# Patient Record
(demographics unavailable — no encounter records)

---

## 2024-10-14 NOTE — HISTORY OF PRESENT ILLNESS
[FreeTextEntry1] : Vas- Dr Matt\par  GI- Dr Morales\par  - Dr Brambila\par  Dental- Dr Lozano\par  Orthodontist- Dr Dilshad Ferrara [address] [facility], [address], [facility]  [address]  [phone] f - [phone]

## 2024-10-14 NOTE — REASON FOR VISIT
[Follow-Up - Clinic] : a clinic follow-up of [Atrial Fibrillation] : atrial fibrillation [Coronary Artery Disease] : coronary artery disease [Hyperlipidemia] : hyperlipidemia [Hypertension] : hypertension [Medication Management] : Medication management [FreeTextEntry1] : T2D

## 2024-10-14 NOTE — REVIEW OF SYSTEMS
[Feeling Fatigued] : feeling fatigued [SOB] : shortness of breath [Joint Pain] : joint pain [Negative] : Heme/Lymph

## 2024-10-14 NOTE — ASSESSMENT
[FreeTextEntry1] : pre op -high risk patient for low risk procedure. he has severe AS and significant CAD. with his hx, would prefer he stay on antiplatlet -plan to contact surgeon and see if okay to do the procedure on asa -will switch from plavix to asa 5 days prior to procedure. he is stable and asymptomatic today, however we suspect he is minimizing his symptoms. will clear him for this low risk procedure with close follow up. inr check 10/7 after restarting warfarin scheduled.   AF- on warfarin + rate control. [id number] score is 5. Told to check INRs more frequently.    Hematuria- gross, since resolved. INR 2.2 and on Plavix.  UA, C & S and cytology sent. Dx with bladder CA. Strong Family Hx of Bladder CA- Father and GF.  ASHD- but 3VD with stents to LAD and Cx. clinically more SOB. I suspect an anginal equivalent, will update Nuc. Done Dec 2017, much improved, minimal LCx ischemia. Now with AM angina, will cath. SL NTG. Cath with tight RCA, stented and much better. On Coumadin and plavix. More Sxs of fatigue and MCDERMOTT, new EDU, will evaluate with echo and Pharm Nuc. Nuc was NL, echo with Mod AS, BRITTNI 1.4 cm2 Sept 2020. Nuc WNL. Update Echo. Reordered. BRITTNI 1.0, probable orthopnea. Seeing Dr Trimble.  Prediabetes-diet reviewed, 6.0, 6.1,  Moderate AS on echo Sept 2020, BRITTNI = 1.4 cm2, NEED TO UPDATE, HE REPORTS AM SOB UNTIL HE GETS OOB, ?Last echo Oct 2023 @ [facility].  Echo July 8th, 2024. Severe low flow AS.  s/p cath, BRITTNI 1.5 cm2. 2022.   ORTHOPNEA. BRITTNI 1.0, probable orthopnea. Referred to Dr Trimble. s/p cath, Oct 2022, clean coronaries.  BRITTNI 1.5 cm2 Oct 2023.  Right pleural effusion and retaining fluid, lasix 20 QOD.  HTN-BP mildly elevated, will add HCTZ weekly. BP up, increase to M & F.  BP way up, add Losartan 25 mg  HLD-continue statin, labs Sept 2020. LDL 55 July 2021  Carotid Dz-sees Dr Matt.  Now with b/l carotid stenosis.  Depression- Needs to exercise, needs to be with people. He finds himself on the computer all the time.  CA Screening- Dr Brambila, told to go see. Cysto (-), PSA 7 and 1.4 cystic mass in head of pancreas. Seeing Dr Nicholson  PreOp- Dental implant- will hold Coumadin for 5 days but continue 1 anti-platelet agent, call placed to dentist  LEVAR- no official dx and he defers a w/u but does snore a lot and c/o fatigue. Never went.

## 2024-10-14 NOTE — PHYSICAL EXAM
[General Appearance - Well Developed] : well developed [Normal Appearance] : normal appearance [Well Groomed] : well groomed [General Appearance - Well Nourished] : well nourished [No Deformities] : no deformities [General Appearance - In No Acute Distress] : no acute distress [Normal Conjunctiva] : the conjunctiva exhibited no abnormalities [Eyelids - No Xanthelasma] : the eyelids demonstrated no xanthelasmas [Respiration, Rhythm And Depth] : normal respiratory rhythm and effort [Exaggerated Use Of Accessory Muscles For Inspiration] : no accessory muscle use [Irregularly Irregular] : the rhythm was irregularly irregular [Abdomen Soft] : soft [Abdomen Tenderness] : non-tender [Abdomen Mass (___ Cm)] : no abdominal mass palpated [Abnormal Walk] : normal gait [Gait - Sufficient For Exercise Testing] : the gait was sufficient for exercise testing [Nail Clubbing] : no clubbing of the fingernails [Cyanosis, Localized] : no localized cyanosis [Petechial Hemorrhages (___cm)] : no petechial hemorrhages [Skin Color & Pigmentation] : normal skin color and pigmentation [] : no rash [No Venous Stasis] : no venous stasis [Skin Lesions] : no skin lesions [No Skin Ulcers] : no skin ulcer [No Xanthoma] : no  xanthoma was observed [Oriented To Time, Place, And Person] : oriented to person, place, and time [Affect] : the affect was normal [Mood] : the mood was normal [No Anxiety] : not feeling anxious [FreeTextEntry1] : B/L Dupuytren's contractures

## 2024-10-16 NOTE — HISTORY OF PRESENT ILLNESS
[FreeTextEntry1] : 83yoM w/known h/o aortic stenosis, planning for a procedure w/Dr. Trimble, was incidentally noted to have b/l ICA stenosis on CTA chest.  Pt reports a previous smoking hx but quit 30y prior, denies any previous personal episodes of adverse neurologic events, and denies family h/o CVA/TIA.  Pt recently underwent TURBT w/Dr. Beltran at [facility] and his Plavix/Warfarin were stopped due to increased bleeding risk.  He is s/p PTCA x 4 in the past via groin access.

## 2024-10-16 NOTE — PROCEDURE
[FreeTextEntry1] : Carotid duplex performed to evaluate for extracranial vascular disease demonstrates a complex fibrocalcific/heterogenous plaque in the L ICA w/associated 50-69% stenosis, R ICA stenosis >70% due to fibrocalcific plaque, /EDV 75, no evidence of dissection, antegrade vertebral artery flow b/l w/o flow-restrictive lesions.

## 2024-10-16 NOTE — ASSESSMENT
[FreeTextEntry1] : 83yoM w/known h/o aortic stenosis, planning for a procedure w/Dr. Trimble, was incidentally noted to have b/l ICA stenosis on CTA chest.  Pt reports a previous smoking hx but quit 30y prior, denies any previous personal episodes of adverse neurologic events, and denies family h/o CVA/TIA.  Pt recently underwent TURBT w/Dr. Beltran at [facility] and his Plavix/Warfarin were stopped due to increased bleeding risk.  He is s/p PTCA x 4 in the past via groin access.  Normal neuro exam noted today.  Carotid duplex performed to evaluate for extracranial vascular disease demonstrates a complex fibrocalcific/heterogenous plaque in the L ICA w/associated 50-69% stenosis, R ICA stenosis >70% due to fibrocalcific plaque, /EDV 75, no evidence of dissection, antegrade vertebral artery flow b/l w/o flow-restrictive lesions.  Pt will likely require revascularization of the R ICA in the future but will first undergo valve repair and contact our office to schedule DAREN surgery after.

## 2024-10-16 NOTE — ADDENDUM
[FreeTextEntry1] : This note was written by Skyler Smith, acting as a scribe for Dr. Jennifer Ignacio.  I, Dr. Jennifer Ignacio, have read and attest that all the information, medical decision-making, and discharge instructions within are true and accurate.  I, Dr. Jennifer Ignacio, personally performed the evaluation and management (E/M) services for this new patient.  That E/M includes conducting the initial examination, assessing all conditions, and establishing the plan of care.  Today, my ACP, Skyler Smith, was here to observe my evaluation and management services for this patient to be followed going forward.

## 2024-10-16 NOTE — PHYSICAL EXAM
[JVD] : no jugular venous distention  [Normal Thyroid] : the thyroid was normal [Carotid Bruits] : no carotid bruits [Normal Breath Sounds] : Normal breath sounds [Respiratory Effort] : normal respiratory effort [Normal Heart Sounds] : normal heart sounds [Murmur] : murmur was appreciated  [Right Carotid Bruit] : no bruit heard over the right carotid [Left Carotid Bruit] : no bruit heard over the left carotid [2+] : left 2+ [Ankle Swelling (On Exam)] : not present [Varicose Veins Of Lower Extremities] : not present [] : not present [Abdomen Masses] : No abdominal masses [Abdomen Tenderness] : ~T ~M No abdominal tenderness [No Rash or Lesion] : No rash or lesion [Purpura] : no purpura  [Petechiae] : no petechiae [Skin Ulcer] : no ulcer [Skin Induration] : no induration [Alert] : alert [Calm] : calm [de-identified] : Healthy, NAD [de-identified] : NC/AT, dinoictbronson, EOMIx6 [de-identified] : +holosystolic murmur noted in the upper chest [de-identified] : FROM throughout, strength 5/5x4, NEG Romberg/Pronator drift [de-identified] : Neurosensory/neuromotor grossly intact

## 2024-10-25 NOTE — RESULTS/DATA
[TextEntry] : 5MWT done on 8/19/2024: 1) 8.74, 2) 7.81, 3) 7.49 KCCQ done on 8/19/2024  ECHO 7/8/2024: calculated ejection fraction of 45-50%.  The aortic leaflets are thickened and calcified with reduced systolic leaflet excursion.  The aortic leaflets are thickened and calcified with reduced systolic leaflet excursion.  The calculated left ventricular stroke volume index is 31.90 ml/m (normal >35 ml/m2). There is low-flow, low-gradient severe aortic stenosis. There is trace aortic regurgitation.  There is mild mitral annular calcification. There is trace mitral regurgitation.  Structurally normal tricuspid valve with normal leaflet excursion. There is mild tricuspid regurgitation.   PYP scan on 7/26/2024 showed:  Impression: 1. No scintigraphic evidence of TTR-meduated cardiac amyloidosis. 2. Small right pleural effusion. 3. Calcification at the aortic valve suggestive of aortic stenosis. Correlate with echocardiogram findings.  Carotid Doppler 8/14/2024: Bulky calcified atherosclerotic plaque narrowing BILATERAL proximal internal carotid arteries with elevated peak systolic velocities compatible with greater than 70% stenosis.  Elevated peak systolic velocities in the. BILATERAL external carotid arteries suggesting flow-limiting stenosis.  TAVR CTs done on 8/14/2024 which showed: IMPRESSION: Cardiac: 1.  Stent present in the proximal, mid, and distal LAD; cannot exclude significant ISR 2.  Stent present in the mid RCA, cannot exclude significant ISR 3.  Calcific plaque obscures the lumen of the distal LM and the RPDA; stenosis severity is indeterminate 4.  OM1 and OM2 are not well visualized 5.  Filling defect in the ANTIONE which may present slow flow vs thrombus IMPRESSION: Extensive calcified and noncalcified atheromatous plaque throughout the aorta. No evidence of hemodynamically significant stenosis of the aorta or peripheral access vessels. 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 Mild fat stranding/fluid in the region of the gastric antrum and proximal duodenum, could be secondary to peptic ulcer disease, or other inflammatory process.  ECHO 10/2/2023:  The left ventricle is normal in size and systolic function with a calculated ejection fraction of 55-60%.   The aortic leaflets are thickened and calcified with reduced systolic leaflet excursion. The peak transvalvular velocity is 2.97 m/s, the mean transvalvular gradient is 19.00 mmHg, and the LVOT/AV velocity ratio is 0.34. The peak transaortic gradient is 35.28 mmHg. The mean transaortic gradient is 19.00 mmHg. The calculated left ventricular stroke volume index is 37.20 ml/m (normal >35 ml/m2). There is trace aortic regurgitation.  There is mild mitral regurgitation.  Structurally normal tricuspid valve with normal leaflet excursion. There is trace tricuspid regurgitation.  No pericardial effusion is seen.  TTE 4/12/2022: LF moderate to severe AS with SVI 24 and MG 11/PG20, normal EF EKG 9/13/22 Afib HR 85bpm, QRS 88 Cr. 1.08 9/13/22 Cardiac Catheterization 10/25/2022:  moderate aortic stenosis with a normal stroke volume  ECHO 10/2/2023: moderate aortic stenosis with a normal stroke volume  10/07/24 labs:  WBC14.97 H/H 13.8/43.9 Plt 253 BUN/Cr 24/1.1

## 2024-10-25 NOTE — PLAN
[TextEntry] : - continue current medication regimen.  - follow up with Dr. You for ongoing cardiology care - Dr. Trimble to coordinate with Dr. Beltran re: bladder CA treatment timing and LHC/TAVR procedure.  - will book LHC/TAVR procedure pending above. Once date is determined, the patient will need pre-procedure labs.  - return to D clinic post TAVR or as needed.   I, Dr. Cecil Trimble, personally performed the evaluation and management (E/M) services for this established patient who presents today with (a) new problem(s)/exacerbation of (an) existing condition(s). That E/M includes conducting the clinically appropriate interval history &/or exam, assessing all new/exacerbated conditions, and establishing a new plan of care. Today, my SUSI, noted herewith, was here to observe my evaluation and management service for this new problem/exacerbated condition and follow the plan of care established by me going forward.

## 2024-10-25 NOTE — ASSESSMENT
[FreeTextEntry1] : 83M with PMH of bladder CA at the bladder neck (followed by Dr. Beltran with planned treatment to start on 11/11/24), prior prostate CA, HTN, HLD, DM, CAD s/p SAMAN to mRCA (2019, on Plavix), chronic AF (on warfarin), HFrEF (LVEF 45%), and symptomatic severe LFLG severe AS (TTE 7/2024: LVEF 45%, PV 3.21, P/MG 41/24, BRITTNI 0.95, SVi 32), who presents for follow up. Dr. Trimble have independently seen and evaluated the patient in this face-to-face encounter. Patient is clinically stable, NYHA Class III. Dr. Trimble reviewed vascular surgery's recommendation and with patient. In light of his recent bladder CA diagnosis, Dr. Trimble would like to coordinate timing of his TAVR procedure and bladder CA treatment with Dr. Beltran. Will schedule patient for his LHC/TAVR pending discussion. All questions were addressed.

## 2024-10-25 NOTE — HISTORY OF PRESENT ILLNESS
[FreeTextEntry1] : Referring: Dr. You  Warfarin management per Dr. You's office.   83M with PMH of bladder CA at the bladder neck (followed by Dr. Beltran with planned treatment to start on 11/11/24), prior prostate CA, HTN, HLD, DM, CAD s/p SAMAN to mRCA (2019, on Plavix), chronic AF (on warfarin), HFrEF (LVEF 45%), and symptomatic severe LFLG severe AS (TTE 7/2024: LVEF 45%, PV 3.21, P/MG 41/24, BRITTNI 0.95, SVi 32), who presents for follow up.   Last seen in [facility] on 8/19/24: recommended LHC/TAVR pending the following:  - CTA head and neck today for further evaluation as well as an appointment with vascular if needed.   ---------------------------------------------------- CTA Head/Neck 8/19/24 IMPRESSION: 1. Severe stenosis of the right cervical internal carotid artery just distal to the bifurcation. 2. Moderate stenosis of the left cervical internal carotid artery. 3. 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 4. Moderate right pleural effusion.  Saw urologist, Dr. Beltran, on 9/12/24 for gross hematuria s/p cystoscopy, TURBN, prostate biopsy on 10/4/24  Saw Vascular on 10/16/2025- normal neuro exam noted today. Carotid duplex performed to evaluate for extracranial vascular disease demonstrates a complex fibrocalcific/heterogenous plaque in the L ICA w/associated 50-69% stenosis, R ICA stenosis >70% due to fibrocalcific plaque, /EDV 75, no evidence of dissection, antegrade vertebral artery flow b/l w/o flow-restrictive lesions. Pt will likely require revascularization of the R ICA in the future but will first undergo valve repair and contact our office to schedule DAREN surgery after.  Today, patient reports ongoing fatigue, chest pressure when he first wakes up in the morning that resolves throughout the day, and dyspnea with minimal activity.

## 2024-10-25 NOTE — HISTORY OF PRESENT ILLNESS
[FreeTextEntry1] : Referring: Dr. You  Warfarin management per Dr. You's office.   83M with PMH of bladder CA at the bladder neck (followed by Dr. Beltran with planned treatment to start on 11/11/24), prior prostate CA, HTN, HLD, DM, CAD s/p SAMAN to mRCA (2019, on Plavix), chronic AF (on warfarin), HFrEF (LVEF 45%), and symptomatic severe LFLG severe AS (TTE 7/2024: LVEF 45%, PV 3.21, P/MG 41/24, BRITTNI 0.95, SVi 32), who presents for follow up.   Last seen in [facility] on 8/19/24: recommended LHC/TAVR pending the following:  - CTA head and neck today for further evaluation as well as an appointment with vascular if needed.   ---------------------------------------------------- CTA Head/Neck 8/19/24 IMPRESSION: 1. Severe stenosis of the right cervical internal carotid artery just distal to the bifurcation. 2. Moderate stenosis of the left cervical internal carotid artery. 3. 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 4. Moderate right pleural effusion.  Saw urologist, Dr. Beltran, on 9/12/24 for gross hematuria s/p cystoscopy, TURBN, prostate biopsy on 10/4/24  Saw Vascular on 10/16/2025- normal neuro exam noted today. Carotid duplex performed to evaluate for extracranial vascular disease demonstrates a complex fibrocalcific/heterogenous plaque in the L ICA w/associated 50-69% stenosis, R ICA stenosis >70% due to fibrocalcific plaque, /EDV 75, no evidence of dissection, antegrade vertebral artery flow b/l w/o flow-restrictive lesions. Pt will likely require revascularization of the R ICA in the future but will first undergo valve repair and contact our office to schedule DAREN surgery after.  Today, patient reports ongoing fatigue, chest pressure when he first wakes up in the morning that resolves throughout the day, and dyspnea with minimal activity.

## 2024-10-25 NOTE — HISTORY OF PRESENT ILLNESS
[FreeTextEntry1] : Referring: Dr. You  Warfarin management per Dr. You's office.   83M with PMH of bladder CA at the bladder neck (followed by Dr. Beltran with planned treatment to start on 11/11/24), prior prostate CA, HTN, HLD, DM, CAD s/p SAMAN to mRCA (2019, on Plavix), chronic AF (on warfarin), HFrEF (LVEF 45%), and symptomatic severe LFLG severe AS (TTE 7/2024: LVEF 45%, PV 3.21, P/MG 41/24, BRITTNI 0.95, SVi 32), who presents for follow up.   Last seen in [facility] on 8/19/24: recommended LHC/TAVR pending the following:  - CTA head and neck today for further evaluation as well as an appointment with vascular if needed.   ---------------------------------------------------- CTA Head/Neck 8/19/24 IMPRESSION: 1. Severe stenosis of the right cervical internal carotid artery just distal to the bifurcation. 2. Moderate stenosis of the left cervical internal carotid artery. 3. 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 4. Moderate right pleural effusion.  Saw urologist, Dr. Beltran, on 9/12/24 for gross hematuria s/p cystoscopy, TURBN, prostate biopsy on 10/4/24  Saw Vascular on 10/16/2025- normal neuro exam noted today. Carotid duplex performed to evaluate for extracranial vascular disease demonstrates a complex fibrocalcific/heterogenous plaque in the L ICA w/associated 50-69% stenosis, R ICA stenosis >70% due to fibrocalcific plaque, /EDV 75, no evidence of dissection, antegrade vertebral artery flow b/l w/o flow-restrictive lesions. Pt will likely require revascularization of the R ICA in the future but will first undergo valve repair and contact our office to schedule DAREN surgery after.  Today, patient reports ongoing fatigue, chest pressure when he first wakes up in the morning that resolves throughout the day, and dyspnea with minimal activity.

## 2024-10-25 NOTE — REASON FOR VISIT
[Family Member] : family member [FreeTextEntry4] : niece: Daniella retired [employer] employee

## 2024-10-25 NOTE — REASON FOR VISIT
[Family Member] : family member [FreeTextEntry4] : niece: Daniella retired [employer] employee

## 2024-10-25 NOTE — REASON FOR VISIT
[Family Member] : family member [FreeTextEntry4] : niece: Daniella retired [employer] employee

## 2024-12-23 NOTE — ASSESSMENT
[FreeTextEntry1] : 83M with PMH of bladder CA at the bladder neck (followed by Dr. Beltran with planned treatment to start on 11/11/24), prior prostate CA, HTN, HLD, DM, CAD s/p SAMAN to mRCA (2019, on Plavix), chronic AF (on warfarin), HFrEF (LVEF 45%), and symptomatic severe LFLG severe AS (TTE 7/2024: LVEF 45%, PV 3.21, P/MG 41/24, BRITTNI 0.95, SVi 32), who presents for follow up, continued sx. NYHA II. TTE 12/18/24 now with worsening LVEF to 35-40%. D/c bladder therapy after 1 treatment dose.  -f/u labs done today -plan for TAVR; [facility] prior given dec in EF (last cath 10/22 revealed patent stents) -d/w Dr. Beltran urology re: treatment plan for bladder

## 2024-12-23 NOTE — PHYSICAL EXAM
[Well Developed] : well developed [Well Nourished] : well nourished [No Acute Distress] : no acute distress [Normal Conjunctiva] : normal conjunctiva [Normal Venous Pressure] : normal venous pressure [No Carotid Bruit] : no carotid bruit [No Rub] : no rub [No Gallop] : no gallop [Clear Lung Fields] : clear lung fields [Good Air Entry] : good air entry [No Respiratory Distress] : no respiratory distress  [Soft] : abdomen soft [Non Tender] : non-tender [No Masses/organomegaly] : no masses/organomegaly [Normal Bowel Sounds] : normal bowel sounds [Normal Gait] : normal gait [No Edema] : no edema [No Cyanosis] : no cyanosis [No Clubbing] : no clubbing [No Varicosities] : no varicosities [No Rash] : no rash [No Skin Lesions] : no skin lesions [Moves all extremities] : moves all extremities [No Focal Deficits] : no focal deficits [Normal Speech] : normal speech [Alert and Oriented] : alert and oriented [Normal memory] : normal memory [de-identified] : nml s1, dim s2, 3/6 EDU base

## 2024-12-23 NOTE — HISTORY OF PRESENT ILLNESS
[FreeTextEntry1] : Referring: Dr. You  Warfarin management per Dr. You's office.   83M with PMH of bladder CA at the bladder neck (followed by Dr. Beltran with planned treatment to start on 11/11/24), prior prostate CA, HTN, HLD, DM, CAD s/p SAMAN to mRCA (2019, on Plavix), chronic AF (on warfarin), HFrEF (LVEF 45%), and symptomatic severe LFLG severe AS (TTE 7/2024: LVEF 45%, PV 3.21, P/MG 41/24, BRITTNI 0.95, SVi 32), who presents for follow up.   Saw Vascular on 10/16/2025- normal neuro exam noted today. Carotid duplex performed to evaluate for extracranial vascular disease demonstrates a complex fibrocalcific/heterogenous plaque in the L ICA w/associated 50-69% stenosis, R ICA stenosis >70% due to fibrocalcific plaque, /EDV 75, no evidence of dissection, antegrade vertebral artery flow b/l w/o flow-restrictive lesions. Pt will likely require revascularization of the R ICA in the future but will first undergo valve repair and contact our office to schedule DAREN surgery after.  Saw urologist, Dr. Beltran, on 9/12/24 for gross hematuria s/p cystoscopy, TURBN, prostate biopsy on 10/4/24. His last visit on 11/26/20254- "patient has high grade noninvasive bladder cancer involving the bladder neck. Resection of the bladder neck actually some voiding better minimal residual on bladder, we will begin BCG therapy in a couple of weeks,. When he compeltes the [facility] therapy- they will have approximately 8 weeks of healing before I have to rebiopsy him. During that time, he will have his valve addressed surgically as well as the carotid"  Today, patient reports ongoing fatigue, chest pressure when he first wakes up in the morning that resolves throughout the day, and dyspnea with minimal activity.  Recently, Mr. Wade was prescribed a therapy treatment for a bladder condition. After the first therapy treatment, he was experiencing urgency and inability to withhold urination, leading to his discontinuation of the treatment. Now, he experiences fatigue, shortness of breath easily, and occasional chest heaviness especially in the morning hours or in the middle of the night.  ECHO today showed BRITTNI 0.72cm2, mild AI, LVEF 37% moderately reduced. TTE from 7/24 showed LVEF 45-50%.

## 2024-12-23 NOTE — ASSESSMENT
[FreeTextEntry1] : 83M with PMH of bladder CA at the bladder neck (followed by Dr. Beltran with planned treatment to start on 11/11/24), prior prostate CA, HTN, HLD, DM, CAD s/p SAMAN to mRCA (2019, on Plavix), chronic AF (on warfarin), HFrEF (LVEF 45%), and symptomatic severe LFLG severe AS (TTE 7/2024: LVEF 45%, PV 3.21, P/MG 41/24, BRITTNI 0.95, SVi 32), who presents for follow up, continued sx. NYHA II. TTE 12/18/24 now with worsening LVEF to 35-40%. D/c bladder therapy after 1 treatment dose.  -f/u labs done today -plan for TAVR; [facility] prior given dec in EF (last cath 10/22 revealed patent stents) -d/w Dr. Beltran urology re: treatment plan for bladder

## 2024-12-23 NOTE — HISTORY OF PRESENT ILLNESS
[FreeTextEntry1] : Referring: Dr. You  Warfarin management per Dr. You's office.   83M with PMH of bladder CA at the bladder neck (followed by Dr. Beltran with planned treatment to start on 11/11/24), prior prostate CA, HTN, HLD, DM, CAD s/p SAMAN to mRCA (2019, on Plavix), chronic AF (on warfarin), HFrEF (LVEF 45%), and symptomatic severe LFLG severe AS (TTE 7/2024: LVEF 45%, PV 3.21, P/MG 41/24, BRITTNI 0.95, SVi 32), who presents for follow up.   Saw Vascular on 10/16/2025- normal neuro exam noted today. Carotid duplex performed to evaluate for extracranial vascular disease demonstrates a complex fibrocalcific/heterogenous plaque in the L ICA w/associated 50-69% stenosis, R ICA stenosis >70% due to fibrocalcific plaque, /EDV 75, no evidence of dissection, antegrade vertebral artery flow b/l w/o flow-restrictive lesions. Pt will likely require revascularization of the R ICA in the future but will first undergo valve repair and contact our office to schedule DAREN surgery after.  Saw urologist, Dr. Beltran, on 9/12/24 for gross hematuria s/p cystoscopy, TURBN, prostate biopsy on 10/4/24. His last visit on 11/26/20254- "patient has high grade noninvasive bladder cancer involving the bladder neck. Resection of the bladder neck actually some voiding better minimal residual on bladder, we will begin BCG therapy in a couple of weeks,. When he compeltes the [facility] therapy- they will have approximately 8 weeks of healing before I have to rebiopsy him. During that time, he will have his valve addressed surgically as well as the carotid"  Today, patient reports ongoing fatigue, chest pressure when he first wakes up in the morning that resolves throughout the day, and dyspnea with minimal activity.  Recently, Mr. Wade was prescribed a therapy treatment for a bladder condition. After the first therapy treatment, he was experiencing urgency and inability to withhold urination, leading to his discontinuation of the treatment. Now, he experiences fatigue, shortness of breath easily, and occasional chest heaviness especially in the morning hours or in the middle of the night.  ECHO today showed BRITTNI 0.72cm2, mild AI, LVEF 37% moderately reduced. TTE from 7/24 showed LVEF 45-50%.

## 2024-12-23 NOTE — ASSESSMENT
[FreeTextEntry1] : 83M with PMH of bladder CA at the bladder neck (followed by Dr. Beltran with planned treatment to start on 11/11/24), prior prostate CA, HTN, HLD, DM, CAD s/p SAMAN to mRCA (2019, on Plavix), chronic AF (on warfarin), HFrEF (LVEF 45%), and symptomatic severe LFLG severe AS (TTE 7/2024: LVEF 45%, PV 3.21, P/MG 41/24, BRITTNI 0.95, SVi 32), who presents for follow up, continued sx. NYHA II. TTE 12/18/24 now with worsening LVEF to 35-40%. D/c bladder therapy after 1 treatment dose.  -f/u labs done today -plan for TAVR; [facility] prior given dec in EF (last cath 10/22 revealed patent stents) -d/w Dr. Beltran urology re: treatment plan for bladder

## 2024-12-23 NOTE — PHYSICAL EXAM
[Well Developed] : well developed [Well Nourished] : well nourished [No Acute Distress] : no acute distress [Normal Conjunctiva] : normal conjunctiva [Normal Venous Pressure] : normal venous pressure [No Carotid Bruit] : no carotid bruit [No Rub] : no rub [No Gallop] : no gallop [Clear Lung Fields] : clear lung fields [Good Air Entry] : good air entry [No Respiratory Distress] : no respiratory distress  [Soft] : abdomen soft [Non Tender] : non-tender [No Masses/organomegaly] : no masses/organomegaly [Normal Bowel Sounds] : normal bowel sounds [Normal Gait] : normal gait [No Edema] : no edema [No Cyanosis] : no cyanosis [No Clubbing] : no clubbing [No Varicosities] : no varicosities [No Rash] : no rash [No Skin Lesions] : no skin lesions [Moves all extremities] : moves all extremities [No Focal Deficits] : no focal deficits [Normal Speech] : normal speech [Alert and Oriented] : alert and oriented [Normal memory] : normal memory [de-identified] : nml s1, dim s2, 3/6 EDU base

## 2024-12-23 NOTE — HISTORY OF PRESENT ILLNESS
[FreeTextEntry1] : Referring: Dr. You  Warfarin management per Dr. You's office.   83M with PMH of bladder CA at the bladder neck (followed by Dr. Beltran with planned treatment to start on 11/11/24), prior prostate CA, HTN, HLD, DM, CAD s/p SAMAN to mRCA (2019, on Plavix), chronic AF (on warfarin), HFrEF (LVEF 45%), and symptomatic severe LFLG severe AS (TTE 7/2024: LVEF 45%, PV 3.21, P/MG 41/24, BRITTNI 0.95, SVi 32), who presents for follow up.   Saw Vascular on 10/16/2025- normal neuro exam noted today. Carotid duplex performed to evaluate for extracranial vascular disease demonstrates a complex fibrocalcific/heterogenous plaque in the L ICA w/associated 50-69% stenosis, R ICA stenosis >70% due to fibrocalcific plaque, /EDV 75, no evidence of dissection, antegrade vertebral artery flow b/l w/o flow-restrictive lesions. Pt will likely require revascularization of the R ICA in the future but will first undergo valve repair and contact our office to schedule DAREN surgery after.  Saw urologist, Dr. Beltran, on 9/12/24 for gross hematuria s/p cystoscopy, TURBN, prostate biopsy on 10/4/24. His last visit on 11/26/20254- "patient has high grade noninvasive bladder cancer involving the bladder neck. Resection of the bladder neck actually some voiding better minimal residual on bladder, we will begin BCG therapy in a couple of weeks,. When he compeltes the [facility] therapy- they will have approximately 8 weeks of healing before I have to rebiopsy him. During that time, he will have his valve addressed surgically as well as the carotid"  Today, patient reports ongoing fatigue, chest pressure when he first wakes up in the morning that resolves throughout the day, and dyspnea with minimal activity.  Recently, Mr. Wade was prescribed a therapy treatment for a bladder condition. After the first therapy treatment, he was experiencing urgency and inability to withhold urination, leading to his discontinuation of the treatment. Now, he experiences fatigue, shortness of breath easily, and occasional chest heaviness especially in the morning hours or in the middle of the night.  ECHO today showed BRITTNI 0.72cm2, mild AI, LVEF 37% moderately reduced. TTE from 7/24 showed LVEF 45-50%.

## 2024-12-23 NOTE — PHYSICAL EXAM
[Well Developed] : well developed [Well Nourished] : well nourished [No Acute Distress] : no acute distress [Normal Conjunctiva] : normal conjunctiva [Normal Venous Pressure] : normal venous pressure [No Carotid Bruit] : no carotid bruit [No Rub] : no rub [No Gallop] : no gallop [Clear Lung Fields] : clear lung fields [Good Air Entry] : good air entry [No Respiratory Distress] : no respiratory distress  [Soft] : abdomen soft [Non Tender] : non-tender [No Masses/organomegaly] : no masses/organomegaly [Normal Bowel Sounds] : normal bowel sounds [Normal Gait] : normal gait [No Edema] : no edema [No Cyanosis] : no cyanosis [No Clubbing] : no clubbing [No Varicosities] : no varicosities [No Rash] : no rash [No Skin Lesions] : no skin lesions [Moves all extremities] : moves all extremities [No Focal Deficits] : no focal deficits [Normal Speech] : normal speech [Alert and Oriented] : alert and oriented [Normal memory] : normal memory [de-identified] : nml s1, dim s2, 3/6 EDU base

## 2024-12-23 NOTE — REVIEW OF SYSTEMS
[Feeling Fatigued] : feeling fatigued [Chest Discomfort] : chest discomfort [Lower Ext Edema] : lower extremity edema [Negative] : Heme/Lymph [Fever] : no fever [Chills] : no chills [FreeTextEntry5] : see HPI  [FreeTextEntry8] : see HPI

## 2025-01-06 NOTE — REASON FOR VISIT
[Home] : at home, [unfilled] , at the time of the visit. [Other Location: e.g. Home (Enter Location, City,State)___] : at [unfilled] [Patient] : the patient [Self] : self [Follow-Up: _____] : a [unfilled] follow-up visit [Family Member] : family member [FreeTextEntry2] : 1/2/2025

## 2025-01-06 NOTE — ASSESSMENT
[FreeTextEntry1] : S/p TAVR- continue Amlodipine, Atorvastatin, Eliquis, Furosemide, and Metoprolol Succinate. Continue MCOT- reviewed directions for use with patient and puala Brewer.

## 2025-01-06 NOTE — PLAN
[TextEntry] : Post Operative Care:  Pt advised of importance of daily weights. Pt advised to call [facility] NP for weight gain of 3 lbs or more. Pt instructed on how to use incentive spirometer every hour, demonstrated proper use.  Pt encouraged to ambulate as much as tolerated, avoiding extreme temperatures outdoors.  Also advised to cleanse incisions daily with mild soap and water and to avoid lotions, powders, ointments or creams near or on the incision. Low salt, low fat diet encouraged and discussed.  Pt advised to avoid heavy lifting or straining.     Follow [facility] team will continue to follow up with pt status.  NP/CCC roles explained with pt understanding, contact information provided. Pt agrees to call with any questions, issues or concerns.  Worsening symptoms reviewed with patient understanding.      FOLLOW UP APPOINTMENTS:  CTS: MARCOS Stroud appointment 1/10/25 @ 9:15 am  CARDIOLOGIST: Dr. You appointment 1/9/25  PCP: Pt encouraged to follow up within one month of discharge

## 2025-01-06 NOTE — PHYSICAL EXAM
[Sclera] : the sclera and conjunctiva were normal [PERRL With Normal Accommodation] : pupils were equal in size, round, and reactive to light [Neck Appearance] : the appearance of the neck was normal [Respiration, Rhythm And Depth] : normal respiratory rhythm and effort [Examination Of The Chest] : the chest was normal in appearance [Chest Visual Inspection Thoracic Asymmetry] : no chest asymmetry [Diminished Respiratory Excursion] : normal chest expansion [FreeTextEntry1] : MCOT to left chest [Abnormal Walk] : normal gait [Nail Clubbing] : no clubbing  or cyanosis of the fingernails [Involuntary Movements] : no involuntary movements were seen [Skin Color & Pigmentation] : normal skin color and pigmentation [Skin Turgor] : normal skin turgor [] : no rash [No Focal Deficits] : no focal deficits [Oriented To Time, Place, And Person] : oriented to person, place, and time [Impaired Insight] : insight and judgment were intact [Affect] : the affect was normal [Mood] : the mood was normal [Memory Recent] : recent memory was not impaired [Memory Remote] : remote memory was not impaired

## 2025-01-10 NOTE — REASON FOR VISIT
[Home] : at home, [unfilled] , at the time of the visit. [Medical Office: ([facility])___] : at the medical office located in  [Patient] : the patient [Self] : self [Follow-Up: _____] : a [unfilled] follow-up visit [FreeTextEntry1] : s/p TAVR

## 2025-01-10 NOTE — ASSESSMENT
[FreeTextEntry1] : 83 year old male with a history of bladder CA at the bladder neck s/p cystoscopy, TURBN and prostate biopsy on 10/4/24 (followed by Dr. Beltran with planned BCG treatment to start on 11/11/24), prior prostate CA, HTN, HLD, DM, CAD s/p SAMAN to mRCA (2019, on Plavix), chronic AF (on Eliquis), carotid stenosis and chronic systolic and diastolic heart failure (LVEF 45%) with severe low-flow, low-gradient aortic stenosis now s/p transfemoral TAVR with Jeniffer Ultra (26mm) on 1/2/25 who presents for follow up after discharge. The patient is clinically stable; NYHA Class I-II. He is doing well since discharge with no cardiac complaints. The ECHO done prior to discharge was reviewed. The MCOT data to date was reviewed; no high-grade heart block or new arrythmias noted, however, average HR is high in low 100s. We will increase Toprol XL back to prior home dose of 100mg PO daily (discussed with Dr. Trimble). The patient will return to [facility] clinic in three weeks with ECHO, EKG and labs. All questions were answered.

## 2025-01-10 NOTE — PLAN
[TextEntry] : (1) Increase Toprol XL to 100mg PO daily (prior home dose).  (2) Return to [facility] clinic in three weeks with ECHO, EKG and labs.  (3) Continue current medication regimen and cardiology care with Dr. You.  (4) Follow up with urologist Dr. Beltran as scheduled re: bladder cancer.  (5) Abx prophylaxis with dental procedures.

## 2025-01-10 NOTE — HISTORY OF PRESENT ILLNESS
[FreeTextEntry1] : Referring: Dr. You  Warfarin management per Dr. You's office.   83 year old male with a history of bladder CA at the bladder neck s/p cystoscopy, TURBN and prostate biopsy on 10/4/24 (followed by Dr. Beltran with planned BCG treatment to start on 11/11/24), prior prostate CA, HTN, HLD, DM, CAD s/p SAMAN to mRCA (2019, on Plavix), chronic AF (on Eliquis), carotid stenosis and chronic systolic and diastolic heart failure (LVEF 45%) with severe low-flow, low-gradient aortic stenosis now s/p transfemoral TAVR with Jeniffer Ultra (26mm) on 1/2/25 who presents for follow up after discharge.   The patient underwent TAVR on 1/2 without complications. Intraop, his QRS widened briefly and the TVP was left in place. On POD 1, the patient was given Toprol 50mg and EKG afterwards was stable. He was deemed stable for discharge home. ECHO done prior to discharge showed the following:  ECHO, 1/2/25: Moderately reduced left ventricular systolic function. A transcatheter aortic valve (TAVR) is noted in the aortic position. The peak transvalvular velocity is 1.10 m/s, the mean transvalvular gradient is 3.00 mmHg. The peak transaortic gradient is 4.84 mmHg. There is no evidence of aortic regurgitation. No pericardial effusion.  Since discharge, the patient states he is feeling well. He denies chest pain and SOB at rest and with exertion. In addition, the patient denies orthopnea, PND, dizziness, syncope, LE edema and palpitations. The patient reports his groin is healing well with no pain, bleeding or swelling. He has been compliant with wearing the MCOT.

## 2025-01-10 NOTE — RESULTS/DATA
[TextEntry] : 5MWT done on 8/19/2024: 1) 8.74, 2) 7.81, 3) 7.49 KCCQ done on 8/19/2024  ECHO 7/8/2024: calculated ejection fraction of 45-50%.  The aortic leaflets are thickened and calcified with reduced systolic leaflet excursion.  The aortic leaflets are thickened and calcified with reduced systolic leaflet excursion.  The calculated left ventricular stroke volume index is 31.90 ml/m (normal >35 ml/m2). There is low-flow, low-gradient severe aortic stenosis. There is trace aortic regurgitation.  There is mild mitral annular calcification. There is trace mitral regurgitation.  Structurally normal tricuspid valve with normal leaflet excursion. There is mild tricuspid regurgitation.   PYP scan on 7/26/2024 showed:  Impression: 1. No scintigraphic evidence of TTR-meduated cardiac amyloidosis. 2. Small right pleural effusion. 3. Calcification at the aortic valve suggestive of aortic stenosis. Correlate with echocardiogram findings.  Carotid Doppler 8/14/2024: Bulky calcified atherosclerotic plaque narrowing BILATERAL proximal internal carotid arteries with elevated peak systolic velocities compatible with greater than 70% stenosis.  Elevated peak systolic velocities in the. BILATERAL external carotid arteries suggesting flow-limiting stenosis.  TAVR CTs done on 8/14/2024 which showed: IMPRESSION: Cardiac: 1.  Stent present in the proximal, mid, and distal LAD; cannot exclude significant ISR 2.  Stent present in the mid RCA, cannot exclude significant ISR 3.  Calcific plaque obscures the lumen of the distal LM and the RPDA; stenosis severity is indeterminate 4.  OM1 and OM2 are not well visualized 5.  Filling defect in the ANTIONE which may present slow flow vs thrombus IMPRESSION: Extensive calcified and noncalcified atheromatous plaque throughout the aorta. No evidence of hemodynamically significant stenosis of the aorta or peripheral access vessels. 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 Mild fat stranding/fluid in the region of the gastric antrum and proximal duodenum, could be secondary to peptic ulcer disease, or other inflammatory process.  ECHO 10/2/2023:  The left ventricle is normal in size and systolic function with a calculated ejection fraction of 55-60%.   The aortic leaflets are thickened and calcified with reduced systolic leaflet excursion. The peak transvalvular velocity is 2.97 m/s, the mean transvalvular gradient is 19.00 mmHg, and the LVOT/AV velocity ratio is 0.34. The peak transaortic gradient is 35.28 mmHg. The mean transaortic gradient is 19.00 mmHg. The calculated left ventricular stroke volume index is 37.20 ml/m (normal >35 ml/m2). There is trace aortic regurgitation.  There is mild mitral regurgitation.  Structurally normal tricuspid valve with normal leaflet excursion. There is trace tricuspid regurgitation.  No pericardial effusion is seen.  TTE 4/12/2022: LF moderate to severe AS with SVI 24 and MG 11/PG20, normal EF EKG 9/13/22 Afib HR 85bpm, QRS 88 Cr. 1.08 9/13/22 Cardiac Catheterization 10/25/2022:  moderate aortic stenosis with a normal stroke volume  ECHO 10/2/2023: moderate aortic stenosis with a normal stroke volume  10/07/24 labs:  WBC14.97 H/H 13.8/43.9 Plt 253 BUN/Cr 24/1.1  ECHO, 1/2/25: Moderately reduced left ventricular systolic function. Mildly dilated right ventricular size. Reduced right ventricular systolic function. Dilated right atrium. A transcatheter aortic valve (TAVR) is noted in the aortic position. The peak transvalvular velocity is 1.10 m/s, the mean transvalvular gradient is 3.00 mmHg, and the LVOT/AV velocity ratio is 0.58. The peak transaortic gradient is 4.84 mmHg. There is no evidence of aortic regurgitation. Mild-to-moderate mitral regurgitation. Moderate tricuspid regurgitation. Pulmonary artery systolic pressure is 36 mmHg. No pericardial effusion.

## 2025-01-10 NOTE — REVIEW OF SYSTEMS
[Negative] : Psychiatric [Fever] : no fever [Headache] : no headache [Chills] : no chills [Feeling Fatigued] : not feeling fatigued [FreeTextEntry8] : see HPI

## 2025-01-14 NOTE — ASSESSMENT
[FreeTextEntry1] :   pre op -high risk patient for low risk procedure. he has severe AS and significant CAD. with his hx, would prefer he stay on antiplatlet -plan to contact surgeon and see if okay to do the procedure on asa -will switch from plavix to asa 5 days prior to procedure. he is stable and asymptomatic today, however we suspect he is minimizing his symptoms. will clear him for this low risk procedure with close follow up. inr check 10/7 after restarting warfarin scheduled. 1/9/25 will follow up for carotid procedure with dr daniel team   AF- on warfarin + rate control. [id number] score is 5. Told to check INRs more frequently.  1/9/25 back on eliquis. significant pvc burden today -will increase metop back to 50mg daily.   Hematuria- gross, since resolved. INR 2.2 and on Plavix.  UA, C & S and cytology sent. Dx with bladder CA. Strong Family Hx of Bladder CA- Father and GF.   ASHD- but 3VD with stents to LAD and Cx. clinically more SOB. I suspect an anginal equivalent, will update Nuc. Done Dec 2017, much improved, minimal LCx ischemia. Now with AM angina, will cath. SL NTG. Cath with tight RCA, stented and much better. On Coumadin and plavix. More Sxs of fatigue and MCDERMOTT, new EDU, will evaluate with echo and Pharm Nuc. Nuc was NL, echo with Mod AS, BRITTNI 1.4 cm2 Sept 2020. Nuc WNL. Update Echo. Reordered. BRITTNI 1.0, probable orthopnea. Seeing Dr Trimble.1/9/25 nonobstructive dx.   Prediabetes-diet reviewed, 6.0, 6.1,  Moderate AS on echo Sept 2020, BRITTNI = 1.4 cm2, NEED TO UPDATE, HE REPORTS AM SOB UNTIL HE GETS OOB, ?Last echo Oct 2023 @ [facility].  Echo July 8th, 2024. Severe low flow AS.  s/p cath, BRITTNI 1.5 cm2. 2022.   ORTHOPNEA. BRITTNI 1.0, probable orthopnea. Referred to Dr Trimble. s/p cath, Oct 2022, clean coronaries.  BRITTNI 1.5 cm2 Oct 2023.  Right pleural effusion and retaining fluid, lasix 20 QOD.  HTN-BP mildly elevated, will add HCTZ weekly. BP up, increase to M & F.  BP way up, add Losartan 25 mg  HLD-continue statin, labs Sept 2020. LDL 55 July 2021  Carotid Dz-sees Dr Matt.  Now with b/l carotid stenosis.  Depression- Needs to exercise, needs to be with people. He finds himself on the computer all the time.  CA Screening- Dr Brambila, told to go see. Cysto (-), PSA 7 and 1.4 cystic mass in head of pancreas. Seeing Dr Nicholson  PreOp- Dental implant- will hold Coumadin for 5 days but continue 1 anti-platelet agent, call placed to dentist  LEVAR- no official dx and he defers a w/u but does snore a lot and c/o fatigue. Never went.

## 2025-01-14 NOTE — HISTORY OF PRESENT ILLNESS
[FreeTextEntry1] : Vas- Dr Matt\par  GI- Dr Morales\par  - Dr Brambila\par  Dental- Dr Lozano\par  Orthodontist- Dr Dilshad Ferrara [address] [facility] [address], [facility]  [address]  [phone] f - [phone]

## 2025-01-18 NOTE — HISTORY OF PRESENT ILLNESS
[FreeTextEntry1] : Referring: Dr. You  Warfarin management per Dr. You's office.   83 year old male with a history of bladder CA at the bladder neck s/p cystoscopy, TURBN and prostate biopsy on 10/4/24 (followed by Dr. Beltran with planned BCG treatment to start on 11/11/24), prior prostate CA, HTN, HLD, DM, CAD s/p SAMAN to mRCA (2019, on Plavix), chronic AF (on Eliquis), carotid stenosis and chronic systolic and diastolic heart failure (LVEF 45%) with severe low-flow, low-gradient aortic stenosis now s/p transfemoral TAVR with Jeniffer Ultra (26mm) on 1/2/25 who presents for follow up for clinical reassessment.    The patient underwent TAVR on 1/2 without complications. Intraop, his QRS widened briefly and the TVP was left in place. On POD 1, the patient was given Toprol 50mg and EKG afterwards was stable. He was deemed stable for discharge home. ECHO done prior to discharge showed the following:  ECHO, 1/2/25: Moderately reduced left ventricular systolic function. A transcatheter aortic valve (TAVR) is noted in the aortic position. The peak transvalvular velocity is 1.10 m/s, the mean transvalvular gradient is 3.00 mmHg. The peak transaortic gradient is 4.84 mmHg. There is no evidence of aortic regurgitation. No pericardial effusion.  Patient's niece contacted [facility] clinic on 1/13/2025- Received a call from patient's niece, Daniella Robertson, earlier today who reports that patient had progressive gotten worse (low energy) over the past week. He came home feeling "great" after his TAVR procedure. Was discharged home on amlodipine 5mg and Toprol XL 50 mg daily (half dose of what he was previously taking). Saw Dr. You last Thursday who increased his Toprol to 75mg daily, then saw MARCOS Franco on Friday, who resumed his home dose of 100mg daily d/t burst of high HR up to 178 on MCOT. Per niece, patient had a SBP reading of 89 on Saturday and today SBP was 100. She feels that the low BP could be contributing to his fatigue and lack of energy to want to do anything.  Given low BPs and fatigue, asked Daniella to stop amlodipine at this time and to closely monitor his BP.  ECHO on 1/15/2025- 1. Left ventricular cavity is normal in size. Left ventricular wall thickness is normal. Abnormal (paradoxical) septal motion consistent with conduction delay. Left ventricular systolic function is moderately decreased with an ejection fraction visually estimated at 35 to 40 %. Global left ventricular hypokinesis.2. Unable to assess left ventricular diastolic function due to insufficient data.3. Enlarged right ventricular cavity size, with normal wall thickness, and mildly reduced right ventricular systolic function.4. Left atrium is mildly dilated.5. The right atrium is severely dilated.6. A Transcatheter deployed (TAVR) valve replacement is present in the aortic position The prosthetic valve has normal function. No prosthetic aortic stenosis. No regurgitation. 7. The peak transaortic velocity is 1.50 m/s, peak transaortic gradient is 9.0 mmHg and mean transaortic gradient is 5.8 mmHg with an LVOT/aortic valve VTI ratio of 0.73. The effective orifice area is estimated at 2.31cm by the continuity equation.8. Mild to moderate mitral regurgitation.9. Mild to moderate tricuspid regurgitation.10. Estimated pulmonary artery systolic pressure is 62 mmHg, consistent with severe pulmonary hypertension.11. No pericardial effusion seen.12. Compared to prior study 12/18/2024 patient is now status post TAVR for moderate to severe aortic stenosis.  Labs on 1/15/2025-  12.6/41.6, creatinine 1.23  , BNP 6768 (increased from 1814)

## 2025-01-18 NOTE — RESULTS/DATA
coagulation(Bleeding disorder R/T clinical cond/anti-coags) [TextEntry] : 5MWT done on 8/19/2024: 1) 8.74, 2) 7.81, 3) 7.49 KCCQ done on 8/19/2024  ECHO 7/8/2024: calculated ejection fraction of 45-50%.  The aortic leaflets are thickened and calcified with reduced systolic leaflet excursion.  The aortic leaflets are thickened and calcified with reduced systolic leaflet excursion.  The calculated left ventricular stroke volume index is 31.90 ml/m (normal >35 ml/m2). There is low-flow, low-gradient severe aortic stenosis. There is trace aortic regurgitation.  There is mild mitral annular calcification. There is trace mitral regurgitation.  Structurally normal tricuspid valve with normal leaflet excursion. There is mild tricuspid regurgitation.   PYP scan on 7/26/2024 showed:  Impression: 1. No scintigraphic evidence of TTR-meduated cardiac amyloidosis. 2. Small right pleural effusion. 3. Calcification at the aortic valve suggestive of aortic stenosis. Correlate with echocardiogram findings.  Carotid Doppler 8/14/2024: Bulky calcified atherosclerotic plaque narrowing BILATERAL proximal internal carotid arteries with elevated peak systolic velocities compatible with greater than 70% stenosis.  Elevated peak systolic velocities in the. BILATERAL external carotid arteries suggesting flow-limiting stenosis.  TAVR CTs done on 8/14/2024 which showed: IMPRESSION: Cardiac: 1.  Stent present in the proximal, mid, and distal LAD; cannot exclude significant ISR 2.  Stent present in the mid RCA, cannot exclude significant ISR 3.  Calcific plaque obscures the lumen of the distal LM and the RPDA; stenosis severity is indeterminate 4.  OM1 and OM2 are not well visualized 5.  Filling defect in the ANTIONE which may present slow flow vs thrombus IMPRESSION: Extensive calcified and noncalcified atheromatous plaque throughout the aorta. No evidence of hemodynamically significant stenosis of the aorta or peripheral access vessels. 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 Mild fat stranding/fluid in the region of the gastric antrum and proximal duodenum, could be secondary to peptic ulcer disease, or other inflammatory process.  ECHO 10/2/2023:  The left ventricle is normal in size and systolic function with a calculated ejection fraction of 55-60%.   The aortic leaflets are thickened and calcified with reduced systolic leaflet excursion. The peak transvalvular velocity is 2.97 m/s, the mean transvalvular gradient is 19.00 mmHg, and the LVOT/AV velocity ratio is 0.34. The peak transaortic gradient is 35.28 mmHg. The mean transaortic gradient is 19.00 mmHg. The calculated left ventricular stroke volume index is 37.20 ml/m (normal >35 ml/m2). There is trace aortic regurgitation.  There is mild mitral regurgitation.  Structurally normal tricuspid valve with normal leaflet excursion. There is trace tricuspid regurgitation.  No pericardial effusion is seen.  TTE 4/12/2022: LF moderate to severe AS with SVI 24 and MG 11/PG20, normal EF EKG 9/13/22 Afib HR 85bpm, QRS 88 Cr. 1.08 9/13/22 Cardiac Catheterization 10/25/2022:  moderate aortic stenosis with a normal stroke volume  ECHO 10/2/2023: moderate aortic stenosis with a normal stroke volume  10/07/24 labs:  WBC14.97 H/H 13.8/43.9 Plt 253 BUN/Cr 24/1.1  ECHO, 1/2/25: Moderately reduced left ventricular systolic function. Mildly dilated right ventricular size. Reduced right ventricular systolic function. Dilated right atrium. A transcatheter aortic valve (TAVR) is noted in the aortic position. The peak transvalvular velocity is 1.10 m/s, the mean transvalvular gradient is 3.00 mmHg, and the LVOT/AV velocity ratio is 0.58. The peak transaortic gradient is 4.84 mmHg. There is no evidence of aortic regurgitation. Mild-to-moderate mitral regurgitation. Moderate tricuspid regurgitation. Pulmonary artery systolic pressure is 36 mmHg. No pericardial effusion.

## 2025-01-18 NOTE — HISTORY OF PRESENT ILLNESS
[FreeTextEntry1] : Referring: Dr. You  Warfarin management per Dr. You's office.   83 year old male with a history of bladder CA at the bladder neck s/p cystoscopy, TURBN and prostate biopsy on 10/4/24 (followed by Dr. Beltran with planned BCG treatment to start on 11/11/24), prior prostate CA, HTN, HLD, DM, CAD s/p SAMAN to mRCA (2019, on Plavix), chronic AF (on Eliquis), carotid stenosis and chronic systolic and diastolic heart failure (LVEF 45%) with severe low-flow, low-gradient aortic stenosis now s/p transfemoral TAVR with Jeniffer Ultra (26mm) on 1/2/25 who presents for follow up for clinical reassessment.    The patient underwent TAVR on 1/2 without complications. Intraop, his QRS widened briefly and the TVP was left in place. On POD 1, the patient was given Toprol 50mg and EKG afterwards was stable. He was deemed stable for discharge home. ECHO done prior to discharge showed the following:  ECHO, 1/2/25: Moderately reduced left ventricular systolic function. A transcatheter aortic valve (TAVR) is noted in the aortic position. The peak transvalvular velocity is 1.10 m/s, the mean transvalvular gradient is 3.00 mmHg. The peak transaortic gradient is 4.84 mmHg. There is no evidence of aortic regurgitation. No pericardial effusion.  Patient's niece contacted [facility] clinic on 1/13/2025- Received a call from patient's niece, Daniella Robertson, earlier today who reports that patient had progressive gotten worse (low energy) over the past week. He came home feeling "great" after his TAVR procedure. Was discharged home on amlodipine 5mg and Toprol XL 50 mg daily (half dose of what he was previously taking). Saw Dr. You last Thursday who increased his Toprol to 75mg daily, then saw MARCOS Franco on Friday, who resumed his home dose of 100mg daily d/t burst of high HR up to 178 on MCOT. Per niece, patient had a SBP reading of 89 on Saturday and today SBP was 100. She feels that the low BP could be contributing to his fatigue and lack of energy to want to do anything.  Given low BPs and fatigue, asked Daniella to stop amlodipine at this time and to closely monitor his BP.  ECHO on 1/15/2025- 1. Left ventricular cavity is normal in size. Left ventricular wall thickness is normal. Abnormal (paradoxical) septal motion consistent with conduction delay. Left ventricular systolic function is moderately decreased with an ejection fraction visually estimated at 35 to 40 %. Global left ventricular hypokinesis.2. Unable to assess left ventricular diastolic function due to insufficient data.3. Enlarged right ventricular cavity size, with normal wall thickness, and mildly reduced right ventricular systolic function.4. Left atrium is mildly dilated.5. The right atrium is severely dilated.6. A Transcatheter deployed (TAVR) valve replacement is present in the aortic position The prosthetic valve has normal function. No prosthetic aortic stenosis. No regurgitation. 7. The peak transaortic velocity is 1.50 m/s, peak transaortic gradient is 9.0 mmHg and mean transaortic gradient is 5.8 mmHg with an LVOT/aortic valve VTI ratio of 0.73. The effective orifice area is estimated at 2.31cm by the continuity equation.8. Mild to moderate mitral regurgitation.9. Mild to moderate tricuspid regurgitation.10. Estimated pulmonary artery systolic pressure is 62 mmHg, consistent with severe pulmonary hypertension.11. No pericardial effusion seen.12. Compared to prior study 12/18/2024 patient is now status post TAVR for moderate to severe aortic stenosis.  Labs on 1/15/2025-  12.6/41.6, creatinine 1.23  , BNP 6768 (increased from 1814)

## 2025-01-18 NOTE — PHYSICAL EXAM
[Sclera] : the sclera and conjunctiva were normal [PERRL With Normal Accommodation] : pupils were equal in size, round, and reactive to light [Neck Appearance] : the appearance of the neck was normal [] : no respiratory distress [Respiration, Rhythm And Depth] : normal respiratory rhythm and effort [Exaggerated Use Of Accessory Muscles For Inspiration] : no accessory muscle use [Abnormal Walk] : normal gait [Oriented To Time, Place, And Person] : oriented to person, place, and time [Impaired Insight] : insight and judgment were intact [Affect] : the affect was normal

## 2025-01-18 NOTE — ASSESSMENT
[FreeTextEntry1] : 83 year old male with a history of bladder CA at the bladder neck s/p cystoscopy, TURBN and prostate biopsy on 10/4/24 (followed by Dr. Beltran with planned BCG treatment to start on 11/11/24), prior prostate CA, HTN, HLD, DM, CAD s/p SAMAN to mRCA (2019, on Plavix), chronic AF (on Eliquis), carotid stenosis and chronic systolic and diastolic heart failure (LVEF 45%) with severe low-flow, low-gradient aortic stenosis now s/p transfemoral TAVR with Jeniffer Ultra (26mm) on 1/2/25 who presents for follow up for clinical reassessment.  Pt seen in clinic with family member.  Discussed the most recent symptoms. Pt reports a shortness of breath at time and ?tightness?. MOCT reviewed- 3.2 sec pause noted in the AM, correlates to feeling unwell.  No other events noted for the duration of the MCOT monitoring.   decreased toprol to 50 from 100 conitinue to monitor reeducated regaring MCOT  gave recommendations to call if symptoms evolve Dr. You made aware

## 2025-02-12 NOTE — PHYSICAL EXAM
[Carotid Bruits] : no carotid bruits [de-identified] : Pleasant, NAD [de-identified] : Right sided subclavian incision is well healed, no signs of infection [de-identified] : FROM 5/5x4 [de-identified] : grossly intact

## 2025-02-12 NOTE — PHYSICAL EXAM
[Carotid Bruits] : no carotid bruits [de-identified] : Pleasant, NAD [de-identified] : Right sided subclavian incision is well healed, no signs of infection [de-identified] : FROM 5/5x4 [de-identified] : grossly intact

## 2025-02-12 NOTE — ADDENDUM
[FreeTextEntry1] :  This note was written by Jerica CROWELL, acting as a scribe for Dr. Jennifer Ignacio.  I, Dr. Jennifer Ignacio, have read and attest that all the information, medical decision-making, and discharge instructions within are true and accurate.  I, Dr. Jennifer Ignacio, personally performed the evaluation and management (E/M) services for this established patient who presents today with (an) existing condition(s).  That E/M includes conducting the examination, assessing all conditions, and (re)establishing/reinforcing a plan of care.  Today, my ACP, Jerica CROWELL, was here to observe my evaluation and management services for this condition to be followed going forward.

## 2025-02-12 NOTE — REVIEW OF SYSTEMS
[Fever] : no fever [Chills] : no chills [Dizziness] : no dizziness [Fainting] : no fainting [Limb Weakness] : no limb weakness

## 2025-02-12 NOTE — ASSESSMENT
[FreeTextEntry1] : 83yoM, former smoker with PMHx of HTN, HLD, DMII, HFrEF (EF 35-40%), CAD s/p PCI, chronic atrial fib (on Eliquis), bladder cancer s/p TURBT (10/4/2024, Dr. Beltran), aortic stenosis s/p TAVR (1/2/25, Dr. Trimble), high grade right ICA stenosis, now s/p R TCAR on 1/30/25 and returns today for a post-op visit.  On exam, Right sided subclavian incision is well healed, no signs of infection. Carotid duplex was done in the office that demonstrated patent R ICA stent. Continue with all medications, including Plavix/Eliquis. RTO in 6 months for a surveillance carotid duplex.

## 2025-02-12 NOTE — HISTORY OF PRESENT ILLNESS
[FreeTextEntry1] : 83yoM, former smoker with PMHx of HTN, HLD, DMII, HFrEF (EF 35-40%), CAD s/p PCI, chronic atrial fib (on Eliquis), bladder cancer s/p TURBT (10/4/2024, Dr. Beltran), aortic stenosis s/p TAVR (1/2/25, Dr. Trimble), high grade right ICA stenosis, now s/p R TCAR on 1/30/25 and returns today for a post-op visit. He is feeling well, denies any neurologic symptoms, fever, chills. He started chemotherapy for his bladder CA.

## 2025-02-21 NOTE — HISTORY OF PRESENT ILLNESS
[FreeTextEntry1] : Referring: Dr. You  Warfarin management per Dr. You's office.   83 year old male with a history of bladder CA at the bladder neck s/p cystoscopy, TURBN and prostate biopsy on 10/4/24 (followed by Dr. Beltran with planned BCG treatment to start on 11/11/24), prior prostate CA, HTN, HLD, DM, CAD s/p SAMAN to mRCA (2019, on Plavix), chronic AF (on Eliquis), carotid stenosis and chronic systolic and diastolic heart failure (LVEF 45%) with severe low-flow, low-gradient aortic stenosis now s/p transfemoral TAVR with Jeniffer Ultra (26mm) on 1/2/25 who underwent a right TCAR on 1/30/2025 who presents for one month follow up.   The patient underwent TAVR on 1/2 without complications. Intraop, his QRS widened briefly and the TVP was left in place. On POD 1, the patient was given Toprol 50mg and EKG afterwards was stable. He was deemed stable for discharge home. ECHO done prior to discharge showed the following:  ECHO on 1/2/25: Moderately reduced left ventricular systolic function. A transcatheter aortic valve (TAVR) is noted in the aortic position. The peak transvalvular velocity is 1.10 m/s, the mean transvalvular gradient is 3.00 mmHg. The peak transaortic gradient is 4.84 mmHg. There is no evidence of aortic regurgitation. No pericardial effusion.  ECHO on 1/15/2025- 1. Left ventricular cavity is normal in size. Left ventricular wall thickness is normal. Abnormal (paradoxical) septal motion consistent with conduction delay. Left ventricular systolic function is moderately decreased with an ejection fraction visually estimated at 35 to 40 %. Global left ventricular hypokinesis.2. Unable to assess left ventricular diastolic function due to insufficient data.3. Enlarged right ventricular cavity size, with normal wall thickness, and mildly reduced right ventricular systolic function.4. Left atrium is mildly dilated.5. The right atrium is severely dilated.6. A Transcatheter deployed (TAVR) valve replacement is present in the aortic position The prosthetic valve has normal function. No prosthetic aortic stenosis. No regurgitation. 7. The peak transaortic velocity is 1.50 m/s, peak transaortic gradient is 9.0 mmHg and mean transaortic gradient is 5.8 mmHg with an LVOT/aortic valve VTI ratio of 0.73. The effective orifice area is estimated at 2.31cm by the continuity equation.8. Mild to moderate mitral regurgitation.9. Mild to moderate tricuspid regurgitation.10. Estimated pulmonary artery systolic pressure is 62 mmHg, consistent with severe pulmonary hypertension.11. No pericardial effusion seen.12. Compared to prior study 12/18/2024 patient is now status post TAVR for moderate to severe aortic stenosis.  Labs on 1/15/2025-  12.6/41.6, creatinine 1.23  , BNP 6768 (increased from 1814)   The patient underwent a right TCAR on 1/30/2025.  Patient was found to be orthostatic on POD1, improved with IV fluids, and resolution on POD2. The patient was discharged home on 2/1/2025.  The patient had an ECHO on 2/19/2025 that showed- 1. Abnormal (paradoxical) septal motion consistent with conduction delay. Left ventricular systolic function is moderately decreased with an ejection fraction visually estimated at 35 %. Global left ventricular hypokinesis. 2. Unable to assess left ventricular diastolic function due to insufficient data.3. Enlarged right ventricular cavity size and reduced right ventricular systolic function.4. Left atrium is moderately dilated.5. The right atrium is severely dilated.6. A bioprosthetic valve replacement valve replacement is present in the aortic position The prosthetic valve has normal function. No prosthetic aortic stenosis. No intravalvular regurgitation.7. The peak transaortic velocity is 1.24 m/s, peak transaortic gradient is 6.2 mmHg and mean transaortic gradient is 3.5 mmHg with an LVOT/aortic valve VTI ratio of 0.68. The effective orifice area is estimated at 2.21 cm by the continuity equation.8. Mild mitral regurgitation.9. Mild to moderate tricuspid regurgitation.10. Estimated pulmonary artery systolic pressure is 48 mmHg, consistent with mild pulmonary hypertension.11. Compared to prior study 1/15/2025 PA systolic pressures have decreased, previously measured 62mmHg.   Since his last visit, the patient states...

## 2025-02-21 NOTE — ASSESSMENT
[FreeTextEntry1] : 83 year old male with a history of bladder CA at the bladder neck s/p cystoscopy, TURBN and prostate biopsy on 10/4/24 (followed by Dr. Beltran with planned BCG treatment to start on 11/11/24), prior prostate CA, HTN, HLD, DM, CAD s/p SAMAN to mRCA (2019, on Plavix), chronic AF (on Eliquis), carotid stenosis and chronic systolic and diastolic heart failure (LVEF 45%) with severe low-flow, low-gradient aortic stenosis now s/p transfemoral TAVR with Jeniffer Ultra (26mm) on 1/2/25 who underwent a right TCAR on 1/30/2025 who presents for one month follow up. echo reviewed  Pt reports he is doing well, doing all acitivties without difficulty, some fatigue after increased exertion.  Discussed appetite in detail- and importance of nutrition- maybe related to chemotherapy for bladder cancer.  Underwent a R TCAR with Dr. Ignacio, uncomplicated with good recovery.   Has close fu with Dr. You, has MRI scheduled to follow a pancreatic mass.   fu in one year with echo, or prn

## 2025-04-03 NOTE — ASSESSMENT
[FreeTextEntry1] :   pre op -high risk patient for low risk procedure. he has severe AS and significant CAD. with his hx, would prefer he stay on antiplatlet -plan to contact surgeon and see if okay to do the procedure on asa -will switch from plavix to asa 5 days prior to procedure. he is stable and asymptomatic today, however we suspect he is minimizing his symptoms. will clear him for this low risk procedure with close follow up. inr check 10/7 after restarting warfarin scheduled. 1/9/25 will follow up for carotid procedure with dr daniel team   AF- on warfarin + rate control. [id number] score is 5. Told to check INRs more frequently.  1/9/25 back on eliquis. significant pvc burden today -will increase metop back to 50mg daily.   Hematuria- gross, since resolved. INR 2.2 and on Plavix.  UA, C & S and cytology sent. Dx with bladder CA. Strong Family Hx of Bladder CA- Father and GF.   ASHD- but 3VD with stents to LAD and Cx. clinically more SOB. I suspect an anginal equivalent, will update Nuc. Done Dec 2017, much improved, minimal LCx ischemia. Now with AM angina, will cath. SL NTG. Cath with tight RCA, stented and much better. On Coumadin and plavix. More Sxs of fatigue and MCDERMOTT, new EDU, will evaluate with echo and Pharm Nuc. Nuc was NL, echo with Mod AS, BRITTNI 1.4 cm2 Sept 2020. Nuc WNL. Update Echo. Reordered. BRITTNI 1.0, probable orthopnea. Seeing Dr Trimble.1/9/25 nonobstructive dx.   Prediabetes-diet reviewed, 6.0, 6.1,  Moderate AS on echo Sept 2020, BRITTNI = 1.4 cm2, NEED TO UPDATE, HE REPORTS AM SOB UNTIL HE GETS OOB, ?Last echo Oct 2023 @ [facility].  Echo July 8th, 2024. Severe low flow AS.  s/p cath, BRITTNI 1.5 cm2. 2022.   ORTHOPNEA. BRITTNI 1.0, probable orthopnea. Referred to Dr Trimble. s/p cath, Oct 2022, clean coronaries.  BRITTNI 1.5 cm2 Oct 2023.  Right pleural effusion and retaining fluid, lasix 20 QOD.  HTN-BP mildly elevated, will add HCTZ weekly. BP up, increase to M & F.  BP way up, add Losartan 25 mg  HLD-continue statin, labs Sept 2020. LDL 55 July 2021  Carotid Dz-sees Dr Matt.  Now with b/l carotid stenosis.  Depression- Needs to exercise, needs to be with people. He finds himself on the computer all the time.  CA Screening- Dr Brambila, told to go see. Cysto (-), PSA 7 and 1.4 cystic mass in head of pancreas. Seeing Dr Nicholson  PreOp- Dental implant- will hold Coumadin for 5 days but continue 1 anti-platelet agent, call placed to dentist  LEVAR- no official dx and he defers a w/u but does snore a lot and c/o fatigue. Never went.

## 2025-04-03 NOTE — PHYSICAL EXAM
[General Appearance - Well Developed] : well developed [Normal Appearance] : normal appearance [Well Groomed] : well groomed [General Appearance - Well Nourished] : well nourished [No Deformities] : no deformities [General Appearance - In No Acute Distress] : no acute distress [Normal Conjunctiva] : the conjunctiva exhibited no abnormalities [Eyelids - No Xanthelasma] : the eyelids demonstrated no xanthelasmas [Respiration, Rhythm And Depth] : normal respiratory rhythm and effort [Exaggerated Use Of Accessory Muscles For Inspiration] : no accessory muscle use [Irregularly Irregular] : the rhythm was irregularly irregular [Abdomen Soft] : soft [Abdomen Tenderness] : non-tender [Abdomen Mass (___ Cm)] : no abdominal mass palpated [Abnormal Walk] : normal gait [Gait - Sufficient For Exercise Testing] : the gait was sufficient for exercise testing [Nail Clubbing] : no clubbing of the fingernails [Cyanosis, Localized] : no localized cyanosis [Petechial Hemorrhages (___cm)] : no petechial hemorrhages [Skin Color & Pigmentation] : normal skin color and pigmentation [] : no rash [No Venous Stasis] : no venous stasis [Skin Lesions] : no skin lesions [No Skin Ulcers] : no skin ulcer [No Xanthoma] : no  xanthoma was observed [Oriented To Time, Place, And Person] : oriented to person, place, and time [Affect] : the affect was normal [Mood] : the mood was normal [No Anxiety] : not feeling anxious [FreeTextEntry1] : moderate to severe edema

## 2025-06-02 NOTE — ASSESSMENT
[FreeTextEntry1] : HFrEF -fluid overloaded today. will increase lasix to 40mg daily. bnp and bmp today. avoiding salt -reinforced today. he would be a good candidate for entresto -has bp room. will discuss when he is euvolemic.   pre op -high risk patient for low risk procedure. he has severe AS and significant CAD. with his hx, would prefer he stay on antiplatlet -plan to contact surgeon and see if okay to do the procedure on asa -will switch from plavix to asa 5 days prior to procedure. he is stable and asymptomatic today, however we suspect he is minimizing his symptoms. will clear him for this low risk procedure with close follow up. inr check 10/7 after restarting warfarin scheduled. 1/9/25 will follow up for carotid procedure with dr daniel team   AF- on warfarin + rate control. [id number] score is 5. Told to check INRs more frequently.  1/9/25 back on eliquis. significant pvc burden today -will increase metop back to 50mg daily.   Hematuria- gross, since resolved. INR 2.2 and on Plavix.  UA, C & S and cytology sent. Dx with bladder CA. Strong Family Hx of Bladder CA- Father and GF.   ASHD- but 3VD with stents to LAD and Cx. clinically more SOB. I suspect an anginal equivalent, will update Nuc. Done Dec 2017, much improved, minimal LCx ischemia. Now with AM angina, will cath. SL NTG. Cath with tight RCA, stented and much better. On Coumadin and plavix. More Sxs of fatigue and MCDERMOTT, new EDU, will evaluate with echo and Pharm Nuc. Nuc was NL, echo with Mod AS, BRITTNI 1.4 cm2 Sept 2020. Nuc WNL. Update Echo. Reordered. BRITTNI 1.0, probable orthopnea. Seeing Dr Trimble.1/9/25 nonobstructive dx.   Prediabetes-diet reviewed, 6.0, 6.1,  Moderate AS on echo Sept 2020, BRITTNI = 1.4 cm2, NEED TO UPDATE, HE REPORTS AM SOB UNTIL HE GETS OOB, ?Last echo Oct 2023 @ [facility].  Echo July 8th, 2024. Severe low flow AS.  s/p cath, BRITTNI 1.5 cm2. 2022.   ORTHOPNEA. BRITTNI 1.0, probable orthopnea. Referred to Dr Trimble. s/p cath, Oct 2022, clean coronaries.  BRITTNI 1.5 cm2 Oct 2023.  Right pleural effusion and retaining fluid, lasix 20 QOD.  HTN-BP mildly elevated, will add HCTZ weekly. BP up, increase to M & F.  BP way up, add Losartan 25 mg  HLD-continue statin, labs Sept 2020. LDL 55 July 2021  Carotid Dz-sees Dr Matt.  Now with b/l carotid stenosis.  Depression- Needs to exercise, needs to be with people. He finds himself on the computer all the time.  CA Screening- Dr Brambila, told to go see. Cysto (-), PSA 7 and 1.4 cystic mass in head of pancreas. Seeing Dr Nicholson  PreOp- Dental implant- will hold Coumadin for 5 days but continue 1 anti-platelet agent, call placed to dentist  LEVAR- no official dx and he defers a w/u but does snore a lot and c/o fatigue. Never went.

## 2025-06-02 NOTE — ASSESSMENT
[FreeTextEntry1] : HFrEF -fluid overloaded today. will increase lasix to 40mg daily. bnp and bmp today. avoiding salt -reinforced today. he would be a good candidate for entresto -has bp room. will discuss when he is euvolemic.   pre op -high risk patient for low risk procedure. he has severe AS and significant CAD. with his hx, would prefer he stay on antiplatlet -plan to contact surgeon and see if okay to do the procedure on asa -will switch from plavix to asa 5 days prior to procedure. he is stable and asymptomatic today, however we suspect he is minimizing his symptoms. will clear him for this low risk procedure with close follow up. inr check 10/7 after restarting warfarin scheduled. 1/9/25 will follow up for carotid procedure with dr daniel team   AF- on warfarin + rate control. [id number] score is 5. Told to check INRs more frequently.  1/9/25 back on eliquis. significant pvc burden today -will increase metop back to 50mg daily.   Hematuria- gross, since resolved. INR 2.2 and on Plavix.  UA, C & S and cytology sent. Dx with bladder CA. Strong Family Hx of Bladder CA- Father and GF.   ASHD- but 3VD with stents to LAD and Cx. clinically more SOB. I suspect an anginal equivalent, will update Nuc. Done Dec 2017, much improved, minimal LCx ischemia. Now with AM angina, will cath. SL NTG. Cath with tight RCA, stented and much better. On Coumadin and plavix. More Sxs of fatigue and MCDERMOTT, new EDU, will evaluate with echo and Pharm Nuc. Nuc was NL, echo with Mod AS, BRITTNI 1.4 cm2 Sept 2020. Nuc WNL. Update Echo. Reordered. BRITTNI 1.0, probable orthopnea. Seeing Dr Trimble.1/9/25 nonobstructive dx.   Prediabetes-diet reviewed, 6.0, 6.1,  Moderate AS on echo Sept 2020, BRITTNI = 1.4 cm2, NEED TO UPDATE, HE REPORTS AM SOB UNTIL HE GETS OOB, ?Last echo Oct 2023 @ [facility].  Echo July 8th, 2024. Severe low flow AS.  s/p cath, BRITTNI 1.5 cm2. 2022.   ORTHOPNEA. BRITTNI 1.0, probable orthopnea. Referred to Dr Trimble. s/p cath, Oct 2022, clean coronaries.  BRITTNI 1.5 cm2 Oct 2023.  Right pleural effusion and retaining fluid, lasix 20 QOD.  HTN-BP mildly elevated, will add HCTZ weekly. BP up, increase to M & F.  BP way up, add Losartan 25 mg  HLD-continue statin, labs Sept 2020. LDL 55 July 2021  Carotid Dz-sees Dr Matt.  Now with b/l carotid stenosis.  Depression- Needs to exercise, needs to be with people. He finds himself on the computer all the time.  CA Screening- Dr Brambila, told to go see. Cysto (-), PSA 7 and 1.4 cystic mass in head of pancreas. Seeing Dr Nicholson  PreOp- Dental implant- will hold Coumadin for 5 days but continue 1 anti-platelet agent, call placed to dentist  LEVAR- no official dx and he defers a w/u but does snore a lot and c/o fatigue. Never went.

## 2025-06-02 NOTE — HISTORY OF PRESENT ILLNESS
[FreeTextEntry1] : [facility]- Dr Carmen SOARES- Dr Brambila Dental- Dr Lozano Orthodontist- Dr Dilshad Ferrara [address] [facility], [place] [address]  [phone] f - [address] ([facility]) [phone]

## 2025-06-02 NOTE — HISTORY OF PRESENT ILLNESS
[FreeTextEntry1] : [facility]- Dr Carmen SOARES- Dr Brambila Dental- Dr Lozano Orthodontist- Dr Dilshad Ferrara [address] [facility], [place] [address]  [phone] f - [address] ([facility]) [phone]

## 2025-06-06 NOTE — ASSESSMENT
[FreeTextEntry1] : HFrEF -fluid overloaded today. will increase lasix to 40mg daily. bnp and bmp today. avoiding salt -reinforced today. he would be a good candidate for entresto -has bp room. will discuss when he is euvolemic. 6/5/25 edema remains up to thighs per HF team, remain on lasix 60mg daily. add entresto 24-26. hold off on increasing metop d/t active HF exac. will take daily weights at home. plan to take labs in 1 week and then follow up with telehealth   pre op -high risk patient for low risk procedure. he has severe AS and significant CAD. with his hx, would prefer he stay on antiplatlet -plan to contact surgeon and see if okay to do the procedure on asa -will switch from plavix to asa 5 days prior to procedure. he is stable and asymptomatic today, however we suspect he is minimizing his symptoms. will clear him for this low risk procedure with close follow up. inr check 10/7 after restarting warfarin scheduled. 1/9/25 will follow up for carotid procedure with dr daniel team 6/5/25 carotid went well  AF- on warfarin + rate control. [id number] score is 5. Told to check INRs more frequently.  1/9/25 back on eliquis. significant pvc burden today -will increase metop back to 50mg daily. 6/5/25 defer increasing bb d/t active HF exac  Hematuria- gross, since resolved. INR 2.2 and on Plavix.  UA, C & S and cytology sent. Dx with bladder CA. Strong Family Hx of Bladder CA- Father and GF.   ASHD- but 3VD with stents to LAD and Cx. clinically more SOB. I suspect an anginal equivalent, will update Nuc. Done Dec 2017, much improved, minimal LCx ischemia. Now with AM angina, will cath. SL NTG. Cath with tight RCA, stented and much better. On Coumadin and plavix. More Sxs of fatigue and MCDERMOTT, new EDU, will evaluate with echo and Pharm Nuc. Nuc was NL, echo with Mod AS, BRITTNI 1.4 cm2 Sept 2020. Nuc WNL. Update Echo. Reordered. BRITTNI 1.0, probable orthopnea. Seeing Dr Trimble.1/9/25 nonobstructive dx.   Prediabetes-diet reviewed, 6.0, 6.1,  Moderate AS on echo Sept 2020, BRITTNI = 1.4 cm2, NEED TO UPDATE, HE REPORTS AM SOB UNTIL HE GETS OOB, ?Last echo Oct 2023 @ [facility].  Echo July 8th, 2024. Severe low flow AS.  s/p cath, BRITTNI 1.5 cm2. 2022.   ORTHOPNEA. BRITTNI 1.0, probable orthopnea. Referred to Dr Trimble. s/p cath, Oct 2022, clean coronaries.  BRITTNI 1.5 cm2 Oct 2023.  Right pleural effusion and retaining fluid, lasix 20 QOD.  HTN-BP mildly elevated, will add HCTZ weekly. BP up, increase to M & F.  BP way up, add Losartan 25 mg  HLD-continue statin, labs Sept 2020. LDL 55 July 2021  Carotid Dz-sees Dr Matt.  Now with b/l carotid stenosis.  Depression- Needs to exercise, needs to be with people. He finds himself on the computer all the time.  CA Screening- Dr Brambila, told to go see. Cysto (-), PSA 7 and 1.4 cystic mass in head of pancreas. Seeing Dr Nicholson  PreOp- Dental implant- will hold Coumadin for 5 days but continue 1 anti-platelet agent, call placed to dentist  LEVAR- no official dx and he defers a w/u but does snore a lot and c/o fatigue. Never went.

## 2025-06-06 NOTE — HISTORY OF PRESENT ILLNESS
[FreeTextEntry1] : [facility]- Dr Carmen SOARES- Dr Brambila Dental- Dr Lozano Orthodontist- Dr Dilshad Ferrara [address] [facility], [place] [address]  [phone] f - [address] ([facility]) [phone]

## 2025-06-06 NOTE — ASSESSMENT
[FreeTextEntry1] : HFrEF -fluid overloaded today. will increase lasix to 40mg daily. bnp and bmp today. avoiding salt -reinforced today. he would be a good candidate for entresto -has bp room. will discuss when he is euvolemic. 6/5/25 edema remains up to thighs per HF team, remain on lasix 60mg daily. add entresto 24-26. hold off on increasing metop d/t active HF exac. will take daily weights at home. plan to take labs in 1 week and then follow up with telehealth   pre op -high risk patient for low risk procedure. he has severe AS and significant CAD. with his hx, would prefer he stay on antiplatlet -plan to contact surgeon and see if okay to do the procedure on asa -will switch from plavix to asa 5 days prior to procedure. he is stable and asymptomatic today, however we suspect he is minimizing his symptoms. will clear him for this low risk procedure with close follow up. inr check 10/7 after restarting warfarin scheduled. 1/9/25 will follow up for carotid procedure with dr daniel team 6/5/25 carotid went well  AF- on warfarin + rate control. [id number] score is 5. Told to check INRs more frequently.  1/9/25 back on eliquis. significant pvc burden today -will increase metop back to 50mg daily. 6/5/25 defer increasing bb d/t active HF exac  Hematuria- gross, since resolved. INR 2.2 and on Plavix.  UA, C & S and cytology sent. Dx with bladder CA. Strong Family Hx of Bladder CA- Father and GF.   ASHD- but 3VD with stents to LAD and Cx. clinically more SOB. I suspect an anginal equivalent, will update Nuc. Done Dec 2017, much improved, minimal LCx ischemia. Now with AM angina, will cath. SL NTG. Cath with tight RCA, stented and much better. On Coumadin and plavix. More Sxs of fatigue and MCDERMOTT, new EDU, will evaluate with echo and Pharm Nuc. Nuc was NL, echo with Mod AS, BRITTNI 1.4 cm2 Sept 2020. Nuc WNL. Update Echo. Reordered. BRITTNI 1.0, probable orthopnea. Seeing Dr Trimble.1/9/25 nonobstructive dx.   Prediabetes-diet reviewed, 6.0, 6.1,  Moderate AS on echo Sept 2020, BRITTNI = 1.4 cm2, NEED TO UPDATE, HE REPORTS AM SOB UNTIL HE GETS OOB, ?Last echo Oct 2023 @ [facility].  Echo July 8th, 2024. Severe low flow AS.  s/p cath, BRITTNI 1.5 cm2. 2022.   ORTHOPNEA. BRITTNI 1.0, probable orthopnea. Referred to Dr Trimble. s/p cath, Oct 2022, clean coronaries.  BRITTNI 1.5 cm2 Oct 2023.  Right pleural effusion and retaining fluid, lasix 20 QOD.  HTN-BP mildly elevated, will add HCTZ weekly. BP up, increase to M & F.  BP way up, add Losartan 25 mg  HLD-continue statin, labs Sept 2020. LDL 55 July 2021  Carotid Dz-sees Dr Matt.  Now with b/l carotid stenosis.  Depression- Needs to exercise, needs to be with people. He finds himself on the computer all the time.  CA Screening- Dr Brambila, told to go see. Cysto (-), PSA 7 and 1.4 cystic mass in head of pancreas. Seeing Dr Nicholson  PreOp- Dental implant- will hold Coumadin for 5 days but continue 1 anti-platelet agent, call placed to dentist  LEVAR- no official dx and he defers a w/u but does snore a lot and c/o fatigue. Never went.

## 2025-06-06 NOTE — HISTORY OF PRESENT ILLNESS
[FreeTextEntry1] : [facility]- Dr Carmen SOARES- Dr Brambila Dental- Dr Lozano Orthodontist- Dr Dilshad Ferrara [address] [facility], [place] [address]  [phone] f - [address] ([facility]) [phone]

## 2025-07-16 NOTE — ASSESSMENT
[FreeTextEntry1] : HFrEF -fluid overloaded today. will increase lasix to 40mg daily. bnp and bmp today. avoiding salt -reinforced today. he would be a good candidate for entresto -has bp room. will discuss when he is euvolemic. 6/5/25 edema remains up to thighs per HF team, remain on lasix 60mg daily. add entresto 24-26. hold off on increasing metop d/t active HF exac. will take daily weights at home. plan to take labs in 1 week and then follow up with telehealth 7/16/25 doing much better today. will cont lasix 20mg and take daily weights  pre op -high risk patient for low risk procedure. he has severe AS and significant CAD. with his hx, would prefer he stay on antiplatlet -plan to contact surgeon and see if okay to do the procedure on asa -will switch from plavix to asa 5 days prior to procedure. he is stable and asymptomatic today, however we suspect he is minimizing his symptoms. will clear him for this low risk procedure with close follow up. inr check 10/7 after restarting warfarin scheduled. 1/9/25 will follow up for carotid procedure with dr daniel team 6/5/25 carotid went well  AF- on warfarin + rate control. [id number] score is 5. Told to check INRs more frequently.  1/9/25 back on eliquis. significant pvc burden today -will increase metop back to 50mg daily. 6/5/25 defer increasing bb d/t active HF exac 7/16/25 hr significantly improved. can consider uptitration of bb   Hematuria- gross, since resolved. INR 2.2 and on Plavix.  UA, C & S and cytology sent. Dx with bladder CA. Strong Family Hx of Bladder CA- Father and GF.   ASHD- but 3VD with stents to LAD and Cx. clinically more SOB. I suspect an anginal equivalent, will update Nuc. Done Dec 2017, much improved, minimal LCx ischemia. Now with AM angina, will cath. SL NTG. Cath with tight RCA, stented and much better. On Coumadin and plavix. More Sxs of fatigue and MCDERMOTT, new EDU, will evaluate with echo and Pharm Nuc. Nuc was NL, echo with Mod AS, BRITTNI 1.4 cm2 Sept 2020. Nuc WNL. Update Echo. Reordered. BRITTNI 1.0, probable orthopnea. Seeing Dr Trimble.1/9/25 nonobstructive dx.   Prediabetes-diet reviewed, 6.0, 6.1,  Moderate AS on echo Sept 2020, BRITTNI = 1.4 cm2, NEED TO UPDATE, HE REPORTS AM SOB UNTIL HE GETS OOB, ?Last echo Oct 2023 @ [facility].  Echo July 8th, 2024. Severe low flow AS.  s/p cath, BRITTNI 1.5 cm2. 2022.   ORTHOPNEA. BRITTNI 1.0, probable orthopnea. Referred to Dr Trimble. s/p cath, Oct 2022, clean coronaries.  BRITTNI 1.5 cm2 Oct 2023.  Right pleural effusion and retaining fluid, lasix 20 QOD.  HTN-BP mildly elevated, will add HCTZ weekly. BP up, increase to M & F.  BP way up, add Losartan 25 mg  HLD-continue statin, labs Sept 2020. LDL 55 July 2021  Carotid Dz-sees Dr Matt.  Now with b/l carotid stenosis.  Depression- Needs to exercise, needs to be with people. He finds himself on the computer all the time.  CA Screening- Dr Brambila, told to go see. Cysto (-), PSA 7 and 1.4 cystic mass in head of pancreas. Seeing Dr Nicholson  PreOp- Dental implant- will hold Coumadin for 5 days but continue 1 anti-platelet agent, call placed to dentist  LEVAR- no official dx and he defers a w/u but does snore a lot and c/o fatigue. Never went.

## 2025-07-16 NOTE — ASSESSMENT
[FreeTextEntry1] : HFrEF -fluid overloaded today. will increase lasix to 40mg daily. bnp and bmp today. avoiding salt -reinforced today. he would be a good candidate for entresto -has bp room. will discuss when he is euvolemic. 6/5/25 edema remains up to thighs per HF team, remain on lasix 60mg daily. add entresto 24-26. hold off on increasing metop d/t active HF exac. will take daily weights at home. plan to take labs in 1 week and then follow up with telehealth 7/16/25 doing much better today. will cont lasix 20mg and take daily weights  pre op -high risk patient for low risk procedure. he has severe AS and significant CAD. with his hx, would prefer he stay on antiplatlet -plan to contact surgeon and see if okay to do the procedure on asa -will switch from plavix to asa 5 days prior to procedure. he is stable and asymptomatic today, however we suspect he is minimizing his symptoms. will clear him for this low risk procedure with close follow up. inr check 10/7 after restarting warfarin scheduled. 1/9/25 will follow up for carotid procedure with dr daniel team 6/5/25 carotid went well  AF- on warfarin + rate control. [id number] score is 5. Told to check INRs more frequently.  1/9/25 back on eliquis. significant pvc burden today -will increase metop back to 50mg daily. 6/5/25 defer increasing bb d/t active HF exac 7/16/25 hr significantly improved. can consider uptitration of bb   Hematuria- gross, since resolved. INR 2.2 and on Plavix.  UA, C & S and cytology sent. Dx with bladder CA. Strong Family Hx of Bladder CA- Father and GF.   ASHD- but 3VD with stents to LAD and Cx. clinically more SOB. I suspect an anginal equivalent, will update Nuc. Done Dec 2017, much improved, minimal LCx ischemia. Now with AM angina, will cath. SL NTG. Cath with tight RCA, stented and much better. On Coumadin and plavix. More Sxs of fatigue and MCDERMOTT, new EDU, will evaluate with echo and Pharm Nuc. Nuc was NL, echo with Mod AS, BRITTNI 1.4 cm2 Sept 2020. Nuc WNL. Update Echo. Reordered. BRITTNI 1.0, probable orthopnea. Seeing Dr Trimble.1/9/25 nonobstructive dx.   Prediabetes-diet reviewed, 6.0, 6.1,  Moderate AS on echo Sept 2020, BRITTNI = 1.4 cm2, NEED TO UPDATE, HE REPORTS AM SOB UNTIL HE GETS OOB, ?Last echo Oct 2023 @ [facility].  Echo July 8th, 2024. Severe low flow AS.  s/p cath, BRITTNI 1.5 cm2. 2022.   ORTHOPNEA. BRITTNI 1.0, probable orthopnea. Referred to Dr Trimble. s/p cath, Oct 2022, clean coronaries.  BRITTNI 1.5 cm2 Oct 2023.  Right pleural effusion and retaining fluid, lasix 20 QOD.  HTN-BP mildly elevated, will add HCTZ weekly. BP up, increase to M & F.  BP way up, add Losartan 25 mg  HLD-continue statin, labs Sept 2020. LDL 55 July 2021  Carotid Dz-sees Dr Matt.  Now with b/l carotid stenosis.  Depression- Needs to exercise, needs to be with people. He finds himself on the computer all the time.  CA Screening- Dr Brambila, told to go see. Cysto (-), PSA 7 and 1.4 cystic mass in head of pancreas. Seeing Dr Nicholson  PreOp- Dental implant- will hold Coumadin for 5 days but continue 1 anti-platelet agent, call placed to dentist  LEVAR- no official dx and he defers a w/u but does snore a lot and c/o fatigue. Never went.

## 2025-07-16 NOTE — HISTORY OF PRESENT ILLNESS
[FreeTextEntry1] : [facility]- Dr Carmen SOARES- Dr Brambila Dental- Dr Lozano Orthodontist- Dr Dilshad Ferrara [address] [facility], [place] [address]  [phone] f - [address] ([facility]) [phone]

## 2025-07-16 NOTE — HISTORY OF PRESENT ILLNESS
[FreeTextEntry1] : [facility]- Dr Carmen SOARES- Dr Brambila Dental- Dr Lozano Orthodontist- Dr Dilshad Ferrara [address] [facility], [place] [address]  [phone] f - [address] ([facility]) [phone]